# Patient Record
Sex: MALE | Race: WHITE | NOT HISPANIC OR LATINO | Employment: STUDENT | URBAN - METROPOLITAN AREA
[De-identification: names, ages, dates, MRNs, and addresses within clinical notes are randomized per-mention and may not be internally consistent; named-entity substitution may affect disease eponyms.]

---

## 2020-03-31 ENCOUNTER — OFFICE VISIT (OUTPATIENT)
Dept: FAMILY MEDICINE CLINIC | Facility: CLINIC | Age: 20
End: 2020-03-31

## 2020-03-31 VITALS
HEIGHT: 72 IN | TEMPERATURE: 98.2 F | DIASTOLIC BLOOD PRESSURE: 70 MMHG | RESPIRATION RATE: 16 BRPM | HEART RATE: 60 BPM | SYSTOLIC BLOOD PRESSURE: 122 MMHG | OXYGEN SATURATION: 99 % | WEIGHT: 153 LBS | BODY MASS INDEX: 20.72 KG/M2

## 2020-03-31 DIAGNOSIS — R53.82 CHRONIC FATIGUE: ICD-10-CM

## 2020-03-31 DIAGNOSIS — R04.0 EPISTAXIS: Primary | ICD-10-CM

## 2020-03-31 DIAGNOSIS — G47.10 DAYTIME HYPERSOMNIA: ICD-10-CM

## 2020-03-31 PROBLEM — Q82.5 PORT-WINE STAIN OF FACE: Status: ACTIVE | Noted: 2020-03-31

## 2020-03-31 PROCEDURE — 99204 OFFICE O/P NEW MOD 45 MIN: CPT | Performed by: FAMILY MEDICINE

## 2020-03-31 NOTE — PROGRESS NOTES
Israel Cristobal is a 19 y.o. male.     Chief Complaint   Patient presents with   • Establish Care     patient is establishing a care   • Nose Bleed     Chronic problem for years   • Cold Extremity   • Fatigue       HPI     Pt is a pleasant 19 y.o. YO male here for Fatigue, epistaxis, and cold extremities.  New patient to me and this office.     Epistaxis: worsens as the air gets dry he gets multiple nose bleeds a day often 20 min a day, has been present for years.  He has had discussion to have it cauterized but it is bothersome. He will use Vaseline with minimal improvement.     Cold hands and cold feet all the time.      Day time fatigue with worsening symptoms during the day and loss of appetite.  He does feel like its a marked change in thyroid.  Usually he does not feel like he wakes up feeling tired and groggy and takes a couple of hours is able to push through. Getting somewhere between 6-9 hours of sleep at night.      The following portions of the patient's history were reviewed and updated as appropriate: allergies, current medications, past family history, past medical history, past social history, past surgical history and problem list.    Review of Systems   Constitutional: Negative.  Negative for activity change and appetite change.   HENT: Negative.  Negative for congestion and dental problem.    Eyes: Negative.  Negative for discharge and itching.   Respiratory: Negative.  Negative for apnea.    Cardiovascular: Negative.  Negative for chest pain.   Gastrointestinal: Negative.  Negative for abdominal distention and abdominal pain.   Endocrine: Negative.  Negative for cold intolerance.   Genitourinary: Negative.  Negative for difficulty urinating.   Musculoskeletal: Negative.  Negative for arthralgias.   Skin: Negative.  Negative for color change.   Allergic/Immunologic: Negative.  Negative for environmental allergies.   Neurological: Negative.  Negative for dizziness.   Hematological: Negative.   Negative for adenopathy.   Psychiatric/Behavioral: Negative.  Negative for agitation.   All other systems reviewed and are negative.      Objective  Vitals:    03/31/20 1027   BP: 122/70   Pulse: 60   Resp: 16   Temp: 98.2 °F (36.8 °C)   SpO2: 99%        Physical Exam   Constitutional: He is oriented to person, place, and time. He appears well-developed and well-nourished. No distress.   HENT:   Head: Normocephalic.   Nose: Nose normal.   Eyes: EOM are normal.   Cardiovascular: Normal rate, regular rhythm, normal heart sounds and intact distal pulses.   No murmur heard.  Pulmonary/Chest: Effort normal and breath sounds normal. No respiratory distress.   Musculoskeletal: Normal range of motion.   Neurological: He is alert and oriented to person, place, and time.   Skin: Skin is warm and dry. No rash noted.   Psychiatric: He has a normal mood and affect. His behavior is normal. Judgment and thought content normal.   Nursing note and vitals reviewed.      No current outpatient medications on file.    Procedures    Lab Results (most recent)     None              Israel was seen today for establish care, nose bleed, cold extremity and fatigue.    Diagnoses and all orders for this visit:    Epistaxis  -     Comprehensive Metabolic Panel  -     CBC & Differential    Chronic fatigue  -     T3, Free  -     TSH  -     T4, Free  -     Comprehensive Metabolic Panel  -     Vitamin D 25 Hydroxy    Daytime hypersomnia    Patient with epistaxis that is chronic for years, he has been offered cauterization in the past but was unable to proceed with those options at that time.  Will wait to refer until coronavirus restrictions for elective procedures have been lifted.  Additionally his symptoms seem to be worse in the winter, he has not been very consistent with using Vaseline and nasal saline so would recommend that he start with that first.    Patient with chronic fatigue, daytime hypersomnia, he does not get restorative sleep.  I  do think further evaluation with sleep medicine would be indicated.  We will start with laboratory evaluation.  Patient will call back once coronavirus restrictions have been lifted to get referral to sleep medicine.    Patient with cold extremities, no abnormalities or other issues.  Reassured patient.      Return if symptoms worsen or fail to improve, for Annual physical.      Kathie Munoz MD

## 2020-04-01 LAB
25(OH)D3+25(OH)D2 SERPL-MCNC: 21.5 NG/ML (ref 30–100)
ALBUMIN SERPL-MCNC: 5.1 G/DL (ref 3.5–5.2)
ALBUMIN/GLOB SERPL: 2 G/DL
ALP SERPL-CCNC: 59 U/L (ref 39–117)
ALT SERPL-CCNC: 22 U/L (ref 1–41)
AST SERPL-CCNC: 20 U/L (ref 1–40)
BASOPHILS # BLD AUTO: 0.03 10*3/MM3 (ref 0–0.2)
BASOPHILS NFR BLD AUTO: 0.6 % (ref 0–1.5)
BILIRUB SERPL-MCNC: 0.6 MG/DL (ref 0.2–1.2)
BUN SERPL-MCNC: 14 MG/DL (ref 6–20)
BUN/CREAT SERPL: 15.2 (ref 7–25)
CALCIUM SERPL-MCNC: 9.9 MG/DL (ref 8.6–10.5)
CHLORIDE SERPL-SCNC: 101 MMOL/L (ref 98–107)
CO2 SERPL-SCNC: 23.9 MMOL/L (ref 22–29)
CREAT SERPL-MCNC: 0.92 MG/DL (ref 0.76–1.27)
EOSINOPHIL # BLD AUTO: 0.08 10*3/MM3 (ref 0–0.4)
EOSINOPHIL NFR BLD AUTO: 1.7 % (ref 0.3–6.2)
ERYTHROCYTE [DISTWIDTH] IN BLOOD BY AUTOMATED COUNT: 12.8 % (ref 12.3–15.4)
GLOBULIN SER CALC-MCNC: 2.5 GM/DL
GLUCOSE SERPL-MCNC: 83 MG/DL (ref 65–99)
HCT VFR BLD AUTO: 46.1 % (ref 37.5–51)
HGB BLD-MCNC: 15.7 G/DL (ref 13–17.7)
IMM GRANULOCYTES # BLD AUTO: 0.01 10*3/MM3 (ref 0–0.05)
IMM GRANULOCYTES NFR BLD AUTO: 0.2 % (ref 0–0.5)
LYMPHOCYTES # BLD AUTO: 1.89 10*3/MM3 (ref 0.7–3.1)
LYMPHOCYTES NFR BLD AUTO: 39.9 % (ref 19.6–45.3)
MCH RBC QN AUTO: 29.2 PG (ref 26.6–33)
MCHC RBC AUTO-ENTMCNC: 34.1 G/DL (ref 31.5–35.7)
MCV RBC AUTO: 85.7 FL (ref 79–97)
MONOCYTES # BLD AUTO: 0.46 10*3/MM3 (ref 0.1–0.9)
MONOCYTES NFR BLD AUTO: 9.7 % (ref 5–12)
NEUTROPHILS # BLD AUTO: 2.27 10*3/MM3 (ref 1.7–7)
NEUTROPHILS NFR BLD AUTO: 47.9 % (ref 42.7–76)
NRBC BLD AUTO-RTO: 0.2 /100 WBC (ref 0–0.2)
PLATELET # BLD AUTO: 186 10*3/MM3 (ref 140–450)
POTASSIUM SERPL-SCNC: 4.4 MMOL/L (ref 3.5–5.2)
PROT SERPL-MCNC: 7.6 G/DL (ref 6–8.5)
RBC # BLD AUTO: 5.38 10*6/MM3 (ref 4.14–5.8)
SODIUM SERPL-SCNC: 138 MMOL/L (ref 136–145)
T3FREE SERPL-MCNC: 3.1 PG/ML (ref 2–4.4)
T4 FREE SERPL-MCNC: 1.17 NG/DL (ref 0.93–1.7)
TSH SERPL DL<=0.005 MIU/L-ACNC: 2.85 UIU/ML (ref 0.27–4.2)
WBC # BLD AUTO: 4.74 10*3/MM3 (ref 3.4–10.8)

## 2020-08-20 ENCOUNTER — HOSPITAL ENCOUNTER (EMERGENCY)
Facility: HOSPITAL | Age: 20
Discharge: HOME | End: 2020-08-20
Attending: EMERGENCY MEDICINE | Admitting: EMERGENCY MEDICINE

## 2020-08-20 ENCOUNTER — APPOINTMENT (OUTPATIENT)
Dept: GENERAL RADIOLOGY | Facility: HOSPITAL | Age: 20
End: 2020-08-20

## 2020-08-20 VITALS
TEMPERATURE: 101 F | OXYGEN SATURATION: 96 % | RESPIRATION RATE: 27 BRPM | HEIGHT: 72 IN | WEIGHT: 150 LBS | DIASTOLIC BLOOD PRESSURE: 63 MMHG | BODY MASS INDEX: 20.32 KG/M2 | HEART RATE: 106 BPM | SYSTOLIC BLOOD PRESSURE: 121 MMHG

## 2020-08-20 DIAGNOSIS — R50.9 ACUTE FEBRILE ILLNESS: Primary | ICD-10-CM

## 2020-08-20 DIAGNOSIS — J06.9 UPPER RESPIRATORY TRACT INFECTION, UNSPECIFIED TYPE: ICD-10-CM

## 2020-08-20 LAB — S PYO AG THROAT QL: NEGATIVE

## 2020-08-20 PROCEDURE — 99284 EMERGENCY DEPT VISIT MOD MDM: CPT

## 2020-08-20 PROCEDURE — 87880 STREP A ASSAY W/OPTIC: CPT | Performed by: EMERGENCY MEDICINE

## 2020-08-20 PROCEDURE — U0004 COV-19 TEST NON-CDC HGH THRU: HCPCS | Performed by: EMERGENCY MEDICINE

## 2020-08-20 PROCEDURE — 36415 COLL VENOUS BLD VENIPUNCTURE: CPT

## 2020-08-20 PROCEDURE — 87081 CULTURE SCREEN ONLY: CPT | Performed by: EMERGENCY MEDICINE

## 2020-08-20 PROCEDURE — 71045 X-RAY EXAM CHEST 1 VIEW: CPT

## 2020-08-20 RX ORDER — ACETAMINOPHEN 500 MG
1000 TABLET ORAL ONCE
Status: COMPLETED | OUTPATIENT
Start: 2020-08-20 | End: 2020-08-20

## 2020-08-20 RX ORDER — IBUPROFEN 800 MG/1
800 TABLET ORAL ONCE
Status: COMPLETED | OUTPATIENT
Start: 2020-08-20 | End: 2020-08-20

## 2020-08-20 RX ADMIN — IBUPROFEN 800 MG: 800 TABLET, FILM COATED ORAL at 21:10

## 2020-08-20 RX ADMIN — ACETAMINOPHEN 1000 MG: 500 TABLET, FILM COATED ORAL at 19:48

## 2020-08-20 NOTE — ED NOTES
I am wearing mask, goggles, and gloves. When designated I am also wearing apron and a face shield. The patient is wearing a surgical mask.      Toi Peguero RN  08/20/20 8233

## 2020-08-20 NOTE — ED PROVIDER NOTES
EMERGENCY DEPARTMENT ENCOUNTER    Room Number:  29/29  Date of encounter:  8/20/2020  PCP: Kathie Munoz MD  Historian: Patient     I used full protective equipment while examining this patient.  This includes face mask, gloves and protective eyewear.  I washed my hands before entering the room and immediately upon leaving the room.  Patient was wearing a surgical mask.      HPI:  Chief Complaint: Muscle aches, fever, chills  A complete HPI/ROS/PMH/PSH/SH/FH are unobtainable due to: None    Context: Israel Cristobal is a 20 y.o. male who presents to the ED c/o diffuse muscle aches, chills, and fever since last night.  T-max was 101.4.  Patient also complains of sore throat, headache, nausea, shortness of breath, and occasional productive cough.  He denies chest pain, abdominal pain, vomiting, diarrhea, or dysuria.  Nothing makes his symptoms better or worse.  Patient states that his infant son had an upper respiratory infection earlier this week and had to be taken to the emergency department.  He denies any other sick contacts.    PAST MEDICAL HISTORY  Active Ambulatory Problems     Diagnosis Date Noted   • Daytime hypersomnia 03/31/2020   • [place] stain of face 03/31/2020     Resolved Ambulatory Problems     Diagnosis Date Noted   • No Resolved Ambulatory Problems     No Additional Past Medical History         PAST SURGICAL HISTORY  Past Surgical History:   Procedure Laterality Date   • PORT WINE STAIN REMOVAL W/ LASER           FAMILY HISTORY  Family History   Problem Relation Age of Onset   • Seasonal affective disorder Mother    • Fainting Father         vasovagal syncope   • Depression Sister    • Fainting Sister         vasovagal syncope   • ADD / ADHD Brother    • Kidney cancer Maternal Grandfather    • Hypertension Maternal Grandfather    • Breast cancer Paternal Grandmother    • Breast cancer Maternal Aunt    • Stroke Neg Hx    • Heart attack Neg Hx          SOCIAL HISTORY  Social History      Socioeconomic History   • Marital status:      Spouse name: Not on file   • Number of children: Not on file   • Years of education: Not on file   • Highest education level: Not on file   Tobacco Use   • Smoking status: Never Smoker   • Smokeless tobacco: Never Used   Substance and Sexual Activity   • Alcohol use: Never     Frequency: Never   • Drug use: Never   • Sexual activity: Yes     Partners: Female     Comment: : Jacki 2017         ALLERGIES  Patient has no known allergies.       REVIEW OF SYSTEMS  Review of Systems      All systems have been reviewed and are negative except as as discussed in the HPI    PHYSICAL EXAM    I have reviewed the triage vital signs and nursing notes.    ED Triage Vitals   Temp Heart Rate Resp BP SpO2   08/20/20 1852 08/20/20 1852 08/20/20 1852 08/20/20 1901 08/20/20 1852   (!) 100.9 °F (38.3 °C) (!) 139 20 116/65 97 %      Temp src Heart Rate Source Patient Position BP Location FiO2 (%)   -- 08/20/20 1917 08/20/20 1917 08/20/20 1917 --    Apical;Monitor Lying Left arm        Physical Exam  GENERAL: Awake, alert, nontoxic appearing  HENT: NCAT, nares patent, moist mucous membranes, mild erythema of the posterior oropharynx, no tonsillar swelling or exudate  NECK: supple, no lymphadenopathy, no meningismus  EYES: no scleral icterus, no photophobia, pupils reactive to light bilaterally  CV: regular rhythm, tachycardic, no murmur  RESPIRATORY: normal effort, clear to auscultation bilaterally  ABDOMEN: soft, nontender, nondistended  MUSCULOSKELETAL: Extremities are nontender and without obvious deformity.  There is normal range of motion in all extremities.   NEURO: Strength, sensation, and coordination are grossly intact.  Speech and mentation are unremarkable.   SKIN: warm, dry, no rash  PSYCH: Normal mood and affect      LAB RESULTS  Recent Results (from the past 24 hour(s))   Rapid Strep A Screen - Swab, Throat    Collection Time: 08/20/20  7:44 PM   Result Value  Ref Range    Strep A Ag Negative Negative       Ordered the above labs and independently reviewed the results.      RADIOLOGY  Xr Chest 1 View    Result Date: 8/20/2020  TEMPORARY  HISTORY:  Cough and fever for a couple of days.  COMPARISON:  None.  FINDINGS:  2 views of the chest were obtained. The cardiac silhouette and mediastinal and hilar contours are within normal limits. The lungs and pleural spaces are clear. The visualized osseous structures are age-appropriate in appearance.  CONCLUSION(S):   1.  No focal consolidation or effusion.  This report was finalized on 8/20/2020 8:26 PM by Dr. Rosalba Martinez M.D.        I ordered the above noted radiological studies. Reviewed by me and discussed with radiologist.  See dictation for official radiology interpretation.      PROCEDURES  Procedures      MEDICATIONS GIVEN IN ER    Medications   acetaminophen (TYLENOL) tablet 1,000 mg (1,000 mg Oral Given 8/20/20 1948)         PROGRESS, DATA ANALYSIS, CONSULTS, AND MEDICAL DECISION MAKING    All labs have been independently reviewed by me.  All radiology studies have been reviewed by me and discussed with radiologist dictating the report.   EKG's independently viewed and interpreted by me.  I have reviewed the nurse's notes, vital signs, past medical history, and medication list.  Discussion below represents my analysis of pertinent findings related to patient's condition, differential diagnosis, treatment plan and final disposition.      ED Course as of Aug 20 2059   Thu Aug 20, 2020   2056 Test results discussed with the patient.  He is resting and breathing comfortably.  He still mildly tachycardic but heart rate is around 110.  He is not hypoxic.  Patient most likely has viral syndrome.  COVID-19 test is pending.  Patient was advised to self quarantine.  Return precautions were discussed.    [WH]      ED Course User Index  [WH] Pedro Ferreira MD       AS OF 20:59 VITALS:    BP - 101/45  HR - 113  TEMP - (!) 100.9  °F (38.3 °C)  O2 SATS - 94%      DIAGNOSIS  Final diagnoses:   Acute febrile illness   Upper respiratory tract infection, unspecified type         DISPOSITION  Discharge    DISCHARGE    Patient discharged in stable condition.    Reviewed implications of results, diagnosis, meds, responsibility to follow up, warning signs and symptoms of possible worsening, potential complications and reasons to return to ER, including persistent fever, increased shortness of breath, or other concern..    Patient/Family voiced understanding of above instructions.    Discussed plan for discharge, as there is no emergent indication for admission. Patient referred to primary care provider for BP management due to today's BP. Pt/family is agreeable and understands need for follow up and repeat testing.  Pt is aware that discharge does not mean that nothing is wrong but it indicates no emergency is present that requires admission and they must continue care with follow-up as given below or physician of their choice.     FOLLOW-UP  Kathie Munoz MD  [address] [address]  [phone]    Call in 2 days  If symptoms persist         Medication List      No changes were made to your prescriptions during this visit.           Please note that this document was completed using voice recognition software     Pedro Ferreiar MD  08/20/20 2100

## 2020-08-20 NOTE — ED TRIAGE NOTES
.Pt masked on arrival, staff masked    Pt from home with reports whole body pains, fever, soa, sore throat, poss exposure d/t sick child [facility]

## 2020-08-21 LAB
REF LAB TEST METHOD: NORMAL
SARS-COV-2 RNA RESP QL NAA+PROBE: NOT DETECTED

## 2020-08-21 NOTE — DISCHARGE INSTRUCTIONS
Drink plenty of fluids.  Alternate Tylenol and ibuprofen for fever.  Return to the emergency department for worsening symptoms, persistent fever, increased shortness of breath, or other concern.  You will need to self quarantine until the results of your COVID-19 test are known.

## 2020-08-22 LAB — BACTERIA SPEC AEROBE CULT: NORMAL

## 2020-09-09 ENCOUNTER — TELEPHONE (OUTPATIENT)
Dept: FAMILY MEDICINE CLINIC | Facility: CLINIC | Age: 20
End: 2020-09-09

## 2020-09-09 DIAGNOSIS — G47.10 HYPERSOMNIA: Primary | ICD-10-CM

## 2020-09-09 NOTE — TELEPHONE ENCOUNTER
PATIENT CALLED IN AND STATED HE WANTED TO KNOW  WHAT NEEDS TO BE DONE . PATIENT STATED HE SENT A MESSAGE THROUGH MY CHART .            PATIENT CALL BACK [phone]

## 2020-09-10 NOTE — TELEPHONE ENCOUNTER
Does pt needs apt or is ok to do the sleep medicine test before and then get apt to discuss or does he needs to being seen first ?

## 2020-09-10 NOTE — TELEPHONE ENCOUNTER
Orders placed for sleep study.  In terms of follow-up with me, if he is having resolution of symptoms he does not need to follow-up with me.  However if he still having ongoing shortness of breath, fatigue or other symptoms I would like to follow-up with him by video.  Thank you

## 2020-09-29 ENCOUNTER — LAB (OUTPATIENT)
Dept: LAB | Facility: HOSPITAL | Age: 20
End: 2020-09-29

## 2020-09-29 ENCOUNTER — OFFICE VISIT (OUTPATIENT)
Dept: SLEEP MEDICINE | Facility: HOSPITAL | Age: 20
End: 2020-09-29

## 2020-09-29 VITALS
WEIGHT: 157.8 LBS | SYSTOLIC BLOOD PRESSURE: 131 MMHG | DIASTOLIC BLOOD PRESSURE: 68 MMHG | HEIGHT: 72 IN | OXYGEN SATURATION: 98 % | BODY MASS INDEX: 21.37 KG/M2 | HEART RATE: 93 BPM

## 2020-09-29 DIAGNOSIS — G47.10 HYPERSOMNIA: ICD-10-CM

## 2020-09-29 DIAGNOSIS — R53.82 CHRONIC FATIGUE: ICD-10-CM

## 2020-09-29 DIAGNOSIS — R06.83 SNORING: Primary | ICD-10-CM

## 2020-09-29 LAB
ALBUMIN SERPL-MCNC: 4.7 G/DL (ref 3.5–5.2)
ALBUMIN/GLOB SERPL: 1.6 G/DL
ALP SERPL-CCNC: 60 U/L (ref 39–117)
ALT SERPL W P-5'-P-CCNC: 17 U/L (ref 1–41)
ANION GAP SERPL CALCULATED.3IONS-SCNC: 10.4 MMOL/L (ref 5–15)
AST SERPL-CCNC: 14 U/L (ref 1–40)
BASOPHILS # BLD AUTO: 0.03 10*3/MM3 (ref 0–0.2)
BASOPHILS NFR BLD AUTO: 0.7 % (ref 0–1.5)
BILIRUB SERPL-MCNC: 0.7 MG/DL (ref 0–1.2)
BUN SERPL-MCNC: 14 MG/DL (ref 6–20)
BUN/CREAT SERPL: 14.4 (ref 7–25)
CALCIUM SPEC-SCNC: 9.7 MG/DL (ref 8.6–10.5)
CHLORIDE SERPL-SCNC: 103 MMOL/L (ref 98–107)
CO2 SERPL-SCNC: 25.6 MMOL/L (ref 22–29)
CREAT SERPL-MCNC: 0.97 MG/DL (ref 0.76–1.27)
DEPRECATED RDW RBC AUTO: 38.1 FL (ref 37–54)
EOSINOPHIL # BLD AUTO: 0.09 10*3/MM3 (ref 0–0.4)
EOSINOPHIL NFR BLD AUTO: 2.1 % (ref 0.3–6.2)
ERYTHROCYTE [DISTWIDTH] IN BLOOD BY AUTOMATED COUNT: 12.7 % (ref 12.3–15.4)
FERRITIN SERPL-MCNC: 54.7 NG/ML (ref 30–400)
FOLATE SERPL-MCNC: 9.1 NG/ML (ref 4.78–24.2)
GFR SERPL CREATININE-BSD FRML MDRD: 97 ML/MIN/1.73
GLOBULIN UR ELPH-MCNC: 2.9 GM/DL
GLUCOSE SERPL-MCNC: 82 MG/DL (ref 65–99)
HCT VFR BLD AUTO: 45.6 % (ref 37.5–51)
HGB BLD-MCNC: 15.5 G/DL (ref 13–17.7)
IMM GRANULOCYTES # BLD AUTO: 0.01 10*3/MM3 (ref 0–0.05)
IMM GRANULOCYTES NFR BLD AUTO: 0.2 % (ref 0–0.5)
IRON 24H UR-MRATE: 99 MCG/DL (ref 59–158)
IRON SATN MFR SERPL: 29 % (ref 20–50)
LYMPHOCYTES # BLD AUTO: 1.48 10*3/MM3 (ref 0.7–3.1)
LYMPHOCYTES NFR BLD AUTO: 33.8 % (ref 19.6–45.3)
MCH RBC QN AUTO: 28.3 PG (ref 26.6–33)
MCHC RBC AUTO-ENTMCNC: 34 G/DL (ref 31.5–35.7)
MCV RBC AUTO: 83.4 FL (ref 79–97)
MONOCYTES # BLD AUTO: 0.35 10*3/MM3 (ref 0.1–0.9)
MONOCYTES NFR BLD AUTO: 8 % (ref 5–12)
NEUTROPHILS NFR BLD AUTO: 2.42 10*3/MM3 (ref 1.7–7)
NEUTROPHILS NFR BLD AUTO: 55.2 % (ref 42.7–76)
NRBC BLD AUTO-RTO: 0 /100 WBC (ref 0–0.2)
PLATELET # BLD AUTO: 194 10*3/MM3 (ref 140–450)
PMV BLD AUTO: 10.3 FL (ref 6–12)
POTASSIUM SERPL-SCNC: 3.9 MMOL/L (ref 3.5–5.2)
PROT SERPL-MCNC: 7.6 G/DL (ref 6–8.5)
RBC # BLD AUTO: 5.47 10*6/MM3 (ref 4.14–5.8)
SODIUM SERPL-SCNC: 139 MMOL/L (ref 136–145)
T4 FREE SERPL-MCNC: 1.4 NG/DL (ref 0.93–1.7)
TIBC SERPL-MCNC: 347 MCG/DL (ref 298–536)
TRANSFERRIN SERPL-MCNC: 233 MG/DL (ref 200–360)
TSH SERPL DL<=0.05 MIU/L-ACNC: 2.33 UIU/ML (ref 0.27–4.2)
VIT B12 BLD-MCNC: 725 PG/ML (ref 211–946)
WBC # BLD AUTO: 4.38 10*3/MM3 (ref 3.4–10.8)

## 2020-09-29 PROCEDURE — 84443 ASSAY THYROID STIM HORMONE: CPT

## 2020-09-29 PROCEDURE — 84439 ASSAY OF FREE THYROXINE: CPT

## 2020-09-29 PROCEDURE — 82565 ASSAY OF CREATININE: CPT

## 2020-09-29 PROCEDURE — 80053 COMPREHEN METABOLIC PANEL: CPT

## 2020-09-29 PROCEDURE — 82728 ASSAY OF FERRITIN: CPT

## 2020-09-29 PROCEDURE — 82746 ASSAY OF FOLIC ACID SERUM: CPT

## 2020-09-29 PROCEDURE — 83540 ASSAY OF IRON: CPT

## 2020-09-29 PROCEDURE — 36415 COLL VENOUS BLD VENIPUNCTURE: CPT

## 2020-09-29 PROCEDURE — 84466 ASSAY OF TRANSFERRIN: CPT

## 2020-09-29 PROCEDURE — 85025 COMPLETE CBC W/AUTO DIFF WBC: CPT

## 2020-09-29 PROCEDURE — G0463 HOSPITAL OUTPT CLINIC VISIT: HCPCS

## 2020-09-29 PROCEDURE — 82607 VITAMIN B-12: CPT

## 2020-09-29 RX ORDER — MULTIVIT-MIN/IRON/FOLIC ACID/K 18-600-40
CAPSULE ORAL
COMMUNITY

## 2020-09-29 NOTE — PROGRESS NOTES
[facility] [place]    Israel Cristobal  : 2000  20 y.o. male   Date of Service: 2020  Referring provider: Kathie Munoz MD    History Of Present Illness:   Mr. Israel Cristobal  is a 20 y.o. right handed Caucasianmale is seen in the sleep clinic for Snoring, Excessive Daytime Sleepiness and Chronic Fatigue.     The patient c/o excessive daytime sleepiness,  and chronic fatigue.  There is no history of hypnagogic hallucinations, sleep paralysis or cataplexy.    The patient complains of snoring loud in all sleeping positions.    The patient complains of bruxism during sleep.     The patient reports that he does not feel rested even after a long sleep and Still feels sleepy even when increasing sleep time.    There is no h/O sleepwalking or bedwetting or nightmares or sleep eating or acting out dreams.    Works: first Shift.  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  In addition he is a full-time student at [employer].    Sleep schedule: While Working: Bed time: Between 10:30 PM to 12 midnight and wake up time: Between 7:30-8 am: sleep Latency : 5 minutes and Total Sleep Time: 7-8 hours, 30 minutes.     Naps: No.    Caffeine intake: 3 Cups of coffee.  Hewlett Sleepiness Scale: 8/24.    History reviewed. No pertinent past medical history.  Past Surgical History:   Procedure Laterality Date   • PORT WINE STAIN REMOVAL W/ LASER       Current Outpatient Medications on File Prior to Visit   Medication Sig Dispense Refill   • Cholecalciferol (Vitamin D) 50 MCG (2000) capsule Vitamin D       No current facility-administered medications on file prior to visit.      No Known Allergies  Family History   Problem Relation Age of Onset   • Seasonal affective disorder Mother    • Fainting Father         vasovagal syncope   • Depression Sister    • Fainting Sister         vasovagal syncope   • ADD / ADHD Brother    • Kidney cancer Maternal Grandfather    • Hypertension Maternal  "Grandfather    • Breast cancer Paternal Grandmother    • Breast cancer Maternal Aunt    • Stroke Neg Hx    • Heart attack Neg Hx      Social History     Socioeconomic History   • Marital status:      Spouse name: Not on file   • Number of children: Not on file   • Years of education: Not on file   • Highest education level: Not on file   Tobacco Use   • Smoking status: Never Smoker   • Smokeless tobacco: Never Used   Substance and Sexual Activity   • Alcohol use: Never     Frequency: Never   • Drug use: Never   • Sexual activity: Yes     Partners: Female     Comment: : Jacki 2017     Review of Systems   Constitutional: Positive for fatigue.   All other systems reviewed and are negative.    Patient examination:  Vitals:    09/29/20 0827   BP: 131/68   Pulse: 93   SpO2: 98%   Weight: 71.6 kg (157 lb 12.8 oz)   Height: 182.9 cm (72\")    Body mass index is 21.4 kg/m².  Neck Circumference: 14.25 inches   HEENT: Normal,  Neck: Supple no carotid bruits.  Lungs: Clear to auscultation.  Cardiac exam: Normal and regular rate and rhythm. No murmurs.  Extremities: No edema clubbing or cyanosis.  Vitamin D level 21.5 in March 31, 2020.  CMP and CBC with differential and was normal in March 2020.    ASSESSMENT AND PLAN:The patient has symptoms of chronic fatigue and excessive daytime sleep sleepiness and will evaluate for obstructive sleep apnea syndrome.  In view of chronic fatigue, plan to obtain CBC, CMP, vitamin B12 level, folate level, serum iron, ferritin, TSH with reflex to free T4.  We will proceed with home sleep study and treat with CPAP therapy if needed. Sleep hygiene techniques discussed.  Exercise diet and weight loss discussed.    Encounter Diagnoses   Name Primary?   • Hypersomnia    • Snoring Yes   • Chronic fatigue        Orders Placed This Encounter   Procedures   • TSH   • T4, free   • Vitamin B12 & Folate   • Iron and TIBC   • Ferritin   • Comprehensive metabolic panel   • eGFR-Glomerular " Filtration   • Home Sleep Study   • CBC & Differential       Return in about 3 months (around 12/29/2020).    Nigel Thomas MD  9/29/2020  09:24 EDT

## 2020-09-30 LAB — CREAT SERPL-MCNC: 0.9 MG/DL (ref 0.76–1.27)

## 2020-10-22 ENCOUNTER — TELEPHONE (OUTPATIENT)
Dept: SLEEP MEDICINE | Facility: HOSPITAL | Age: 20
End: 2020-10-22

## 2020-10-28 ENCOUNTER — HOSPITAL ENCOUNTER (OUTPATIENT)
Dept: SLEEP MEDICINE | Facility: HOSPITAL | Age: 20
Discharge: HOME | End: 2020-10-28
Admitting: PSYCHIATRY & NEUROLOGY

## 2020-10-28 PROCEDURE — 95806 SLEEP STUDY UNATT&RESP EFFT: CPT

## 2020-11-03 ENCOUNTER — TELEPHONE (OUTPATIENT)
Dept: SLEEP MEDICINE | Facility: HOSPITAL | Age: 20
End: 2020-11-03

## 2021-04-16 ENCOUNTER — BULK ORDERING (OUTPATIENT)
Dept: CASE MANAGEMENT | Facility: OTHER | Age: 21
End: 2021-04-16

## 2021-04-16 DIAGNOSIS — Z23 IMMUNIZATION DUE: ICD-10-CM

## 2024-05-09 ENCOUNTER — OFFICE (OUTPATIENT)
Dept: URBAN - METROPOLITAN AREA CLINIC 18 | Facility: CLINIC | Age: 24
End: 2024-05-09

## 2024-05-09 VITALS
WEIGHT: 154.2 LBS | HEART RATE: 75 BPM | SYSTOLIC BLOOD PRESSURE: 118 MMHG | DIASTOLIC BLOOD PRESSURE: 80 MMHG | HEIGHT: 72 IN

## 2024-05-09 DIAGNOSIS — K58.2 MIXED IRRITABLE BOWEL SYNDROME: ICD-10-CM

## 2024-05-09 PROCEDURE — 99203 OFFICE O/P NEW LOW 30 MIN: CPT | Performed by: INTERNAL MEDICINE

## 2024-05-09 RX ORDER — HYOSCYAMINE SULFATE 0.12 MG/1
TABLET ORAL
Qty: 90 | Refills: -1 | Status: ACTIVE
Start: 2024-05-09

## 2024-05-09 NOTE — SERVICEHPINOTES
Geovani Sands   is a   24   year old   male   patient who is seen   at the request of   Ana Maria Grewal   for a consultation/initial visit.  Chief Complaint: Guille presents with longstanding gastrointestinal issues, characterized by inconsistent bloating, minor abdominal pain, and irregular bowel movements, which include both constipation and diarrhea. He reports fluctuations in appetite but denies any presence of blood in his stool or associated symptoms such as skin rashes or arthritis.History of Present Illness: Guille's symptoms have been persistent for several years, with a notable increase in bloating over the past two to three years. Despite these ongoing gastrointestinal issues, his weight has remained stable since 2014. Previously, his primary care physician prescribed a stool softener, which slightly improved his bowel regularity but failed to alleviate his other symptoms. Guille has not tried any fiber supplements. His most recent blood work was conducted approximately six to eight months ago. He denies any family history of colitis, Crohn's disease, or celiac disease.Additional Information: In terms of lifestyle, Guille maintains adequate hydration and avoids soda consumption. He reports drinking a small amount of alcohol weekly and uses Excedrin as needed for pain relief. Currently, he is not on any regular medications.

## 2024-06-26 ENCOUNTER — OFFICE (OUTPATIENT)
Dept: URBAN - METROPOLITAN AREA CLINIC 18 | Facility: CLINIC | Age: 24
End: 2024-06-26

## 2024-06-26 VITALS
HEIGHT: 72 IN | WEIGHT: 152 LBS | DIASTOLIC BLOOD PRESSURE: 80 MMHG | HEART RATE: 55 BPM | SYSTOLIC BLOOD PRESSURE: 136 MMHG

## 2024-06-26 DIAGNOSIS — K58.2 MIXED IRRITABLE BOWEL SYNDROME: ICD-10-CM

## 2024-06-26 DIAGNOSIS — R14.0 ABDOMINAL DISTENSION (GASEOUS): ICD-10-CM

## 2024-06-26 PROCEDURE — 99213 OFFICE O/P EST LOW 20 MIN: CPT | Mod: SA
